# Patient Record
Sex: FEMALE | Race: WHITE | HISPANIC OR LATINO
[De-identification: names, ages, dates, MRNs, and addresses within clinical notes are randomized per-mention and may not be internally consistent; named-entity substitution may affect disease eponyms.]

---

## 2017-03-03 PROBLEM — Z00.00 ENCOUNTER FOR PREVENTIVE HEALTH EXAMINATION: Status: ACTIVE | Noted: 2017-03-03

## 2017-03-06 ENCOUNTER — APPOINTMENT (OUTPATIENT)
Dept: ANTEPARTUM | Facility: CLINIC | Age: 22
End: 2017-03-06

## 2017-03-06 VITALS
BODY MASS INDEX: 32.99 KG/M2 | WEIGHT: 198 LBS | TEMPERATURE: 98 F | HEART RATE: 88 BPM | OXYGEN SATURATION: 100 % | HEIGHT: 65 IN

## 2017-03-06 VITALS
BODY MASS INDEX: 32.99 KG/M2 | HEIGHT: 65 IN | DIASTOLIC BLOOD PRESSURE: 62 MMHG | SYSTOLIC BLOOD PRESSURE: 110 MMHG | WEIGHT: 198 LBS

## 2017-03-06 DIAGNOSIS — Z83.3 FAMILY HISTORY OF DIABETES MELLITUS: ICD-10-CM

## 2017-03-06 DIAGNOSIS — Z82.3 FAMILY HISTORY OF STROKE: ICD-10-CM

## 2017-03-06 DIAGNOSIS — Z80.6 FAMILY HISTORY OF LEUKEMIA: ICD-10-CM

## 2017-03-06 DIAGNOSIS — Z82.49 FAMILY HISTORY OF ISCHEMIC HEART DISEASE AND OTHER DISEASES OF THE CIRCULATORY SYSTEM: ICD-10-CM

## 2017-03-06 DIAGNOSIS — Z87.09 PERSONAL HISTORY OF OTHER DISEASES OF THE RESPIRATORY SYSTEM: ICD-10-CM

## 2017-04-05 ENCOUNTER — APPOINTMENT (OUTPATIENT)
Dept: OBGYN | Facility: CLINIC | Age: 22
End: 2017-04-05

## 2017-04-05 VITALS — DIASTOLIC BLOOD PRESSURE: 60 MMHG | SYSTOLIC BLOOD PRESSURE: 110 MMHG | WEIGHT: 198 LBS

## 2017-04-06 LAB
BILIRUB UR QL STRIP: NORMAL
CLARITY UR: CLEAR
COLLECTION METHOD: NORMAL
GLUCOSE UR-MCNC: NORMAL
HCG UR QL: 0.2 EU/DL
HGB UR QL STRIP.AUTO: NORMAL
KETONES UR-MCNC: NORMAL
LEUKOCYTE ESTERASE UR QL STRIP: NORMAL
NITRITE UR QL STRIP: NORMAL
PH UR STRIP: 5
PROT UR STRIP-MCNC: NORMAL
SP GR UR STRIP: 1.1

## 2017-04-18 ENCOUNTER — APPOINTMENT (OUTPATIENT)
Dept: ANTEPARTUM | Facility: CLINIC | Age: 22
End: 2017-04-18

## 2017-05-03 ENCOUNTER — APPOINTMENT (OUTPATIENT)
Dept: OBGYN | Facility: CLINIC | Age: 22
End: 2017-05-03

## 2017-05-03 ENCOUNTER — OTHER (OUTPATIENT)
Age: 22
End: 2017-05-03

## 2017-06-01 ENCOUNTER — APPOINTMENT (OUTPATIENT)
Dept: OBGYN | Facility: CLINIC | Age: 22
End: 2017-06-01

## 2017-06-01 ENCOUNTER — OUTPATIENT (OUTPATIENT)
Dept: OUTPATIENT SERVICES | Facility: HOSPITAL | Age: 22
LOS: 1 days | Discharge: HOME | End: 2017-06-01

## 2017-06-01 VITALS — DIASTOLIC BLOOD PRESSURE: 60 MMHG | SYSTOLIC BLOOD PRESSURE: 100 MMHG | WEIGHT: 209 LBS

## 2017-06-01 DIAGNOSIS — O99.89 OTHER SPECIFIED DISEASES AND CONDITIONS COMPLICATING PREGNANCY, CHILDBIRTH AND THE PUERPERIUM: ICD-10-CM

## 2017-06-07 LAB
BASOPHILS # BLD: 0.01 TH/MM3
BASOPHILS NFR BLD: 0.1 %
DIFFERENTIAL METHOD BLD: NORMAL
EOSINOPHIL # BLD: 0.12 TH/MM3
EOSINOPHIL NFR BLD: 1.4 %
ERYTHROCYTE [DISTWIDTH] IN BLOOD BY AUTOMATED COUNT: 14.7 %
GLUCOSE 1H P GLC SERPL-MCNC: 94 MG/DL
GRANULOCYTES # BLD: 6.21 TH/MM3
GRANULOCYTES NFR BLD: 71.9 %
HCT VFR BLD AUTO: 33.7 %
HGB BLD-MCNC: 10.6 G/DL
IMM GRANULOCYTES # BLD: 0.05 TH/MM3
IMM GRANULOCYTES NFR BLD: 0.6 %
LYMPHOCYTES # BLD: 1.84 TH/MM3
LYMPHOCYTES NFR BLD: 21.3 %
MCH RBC QN AUTO: 24.9 PG
MCHC RBC AUTO-ENTMCNC: 31.5 G/DL
MCV RBC AUTO: 79.3 FL
MONOCYTES # BLD: 0.41 TH/MM3
MONOCYTES NFR BLD: 4.7 %
PLATELET # BLD: 135 TH/MM3
PMV BLD AUTO: 12.2 FL
RBC # BLD AUTO: 4.25 MIL/MM3
WBC # BLD: 8.64 TH/MM3

## 2017-06-15 ENCOUNTER — APPOINTMENT (OUTPATIENT)
Dept: OBGYN | Facility: CLINIC | Age: 22
End: 2017-06-15

## 2017-06-15 ENCOUNTER — OUTPATIENT (OUTPATIENT)
Dept: OUTPATIENT SERVICES | Facility: HOSPITAL | Age: 22
LOS: 1 days | Discharge: HOME | End: 2017-06-15

## 2017-06-15 ENCOUNTER — RESULT CHARGE (OUTPATIENT)
Age: 22
End: 2017-06-15

## 2017-06-15 VITALS — DIASTOLIC BLOOD PRESSURE: 62 MMHG | WEIGHT: 206 LBS | SYSTOLIC BLOOD PRESSURE: 100 MMHG

## 2017-06-15 DIAGNOSIS — O99.89 OTHER SPECIFIED DISEASES AND CONDITIONS COMPLICATING PREGNANCY, CHILDBIRTH AND THE PUERPERIUM: ICD-10-CM

## 2017-06-16 LAB
BILIRUB UR QL STRIP: NORMAL
CLARITY UR: CLEAR
COLLECTION METHOD: NORMAL
GLUCOSE UR-MCNC: NORMAL
HCG UR QL: 0.2 EU/DL
HGB UR QL STRIP.AUTO: NORMAL
KETONES UR-MCNC: NORMAL
LEUKOCYTE ESTERASE UR QL STRIP: NORMAL
NITRITE UR QL STRIP: NORMAL
PH UR STRIP: 7
PROT UR STRIP-MCNC: NORMAL
SP GR UR STRIP: 1.02

## 2017-06-21 ENCOUNTER — APPOINTMENT (OUTPATIENT)
Dept: ANTEPARTUM | Facility: CLINIC | Age: 22
End: 2017-06-21

## 2017-06-28 DIAGNOSIS — Z3A.28 28 WEEKS GESTATION OF PREGNANCY: ICD-10-CM

## 2017-06-28 DIAGNOSIS — Z3A.30 30 WEEKS GESTATION OF PREGNANCY: ICD-10-CM

## 2017-06-29 ENCOUNTER — APPOINTMENT (OUTPATIENT)
Dept: OBGYN | Facility: CLINIC | Age: 22
End: 2017-06-29

## 2017-07-06 ENCOUNTER — APPOINTMENT (OUTPATIENT)
Dept: OBGYN | Facility: CLINIC | Age: 22
End: 2017-07-06

## 2017-07-06 ENCOUNTER — RESULT CHARGE (OUTPATIENT)
Age: 22
End: 2017-07-06

## 2017-07-06 ENCOUNTER — OUTPATIENT (OUTPATIENT)
Dept: OUTPATIENT SERVICES | Facility: HOSPITAL | Age: 22
LOS: 1 days | Discharge: HOME | End: 2017-07-06

## 2017-07-06 VITALS
WEIGHT: 209 LBS | BODY MASS INDEX: 34.82 KG/M2 | DIASTOLIC BLOOD PRESSURE: 72 MMHG | SYSTOLIC BLOOD PRESSURE: 110 MMHG | HEIGHT: 65 IN

## 2017-07-06 DIAGNOSIS — O99.89 OTHER SPECIFIED DISEASES AND CONDITIONS COMPLICATING PREGNANCY, CHILDBIRTH AND THE PUERPERIUM: ICD-10-CM

## 2017-07-06 DIAGNOSIS — Z3A.33 33 WEEKS GESTATION OF PREGNANCY: ICD-10-CM

## 2017-07-07 LAB
BILIRUB UR QL STRIP: NORMAL
CLARITY UR: NORMAL
COLLECTION METHOD: NORMAL
GLUCOSE UR-MCNC: NORMAL
HCG UR QL: 0.2 EU/DL
HGB UR QL STRIP.AUTO: NORMAL
KETONES UR-MCNC: NORMAL
LEUKOCYTE ESTERASE UR QL STRIP: NORMAL
NITRITE UR QL STRIP: NORMAL
PH UR STRIP: 6.5
PROT UR STRIP-MCNC: NORMAL
SP GR UR STRIP: 1.02

## 2017-07-20 ENCOUNTER — RESULT REVIEW (OUTPATIENT)
Age: 22
End: 2017-07-20

## 2017-07-20 ENCOUNTER — RESULT CHARGE (OUTPATIENT)
Age: 22
End: 2017-07-20

## 2017-07-20 ENCOUNTER — APPOINTMENT (OUTPATIENT)
Dept: OBGYN | Facility: CLINIC | Age: 22
End: 2017-07-20

## 2017-07-20 ENCOUNTER — OUTPATIENT (OUTPATIENT)
Dept: OUTPATIENT SERVICES | Facility: HOSPITAL | Age: 22
LOS: 1 days | Discharge: HOME | End: 2017-07-20

## 2017-07-20 VITALS
SYSTOLIC BLOOD PRESSURE: 110 MMHG | DIASTOLIC BLOOD PRESSURE: 70 MMHG | WEIGHT: 215 LBS | HEIGHT: 65 IN | BODY MASS INDEX: 35.82 KG/M2

## 2017-07-20 DIAGNOSIS — O99.89 OTHER SPECIFIED DISEASES AND CONDITIONS COMPLICATING PREGNANCY, CHILDBIRTH AND THE PUERPERIUM: ICD-10-CM

## 2017-07-20 LAB
BILIRUB UR QL STRIP: NORMAL
CLARITY UR: CLEAR
COLLECTION METHOD: NORMAL
GLUCOSE UR-MCNC: NORMAL
HCG UR QL: 0.2 EU/DL
HGB UR QL STRIP.AUTO: NORMAL
KETONES UR-MCNC: NORMAL
LEUKOCYTE ESTERASE UR QL STRIP: NORMAL
NITRITE UR QL STRIP: NORMAL
PH UR STRIP: 6.5
PROT UR STRIP-MCNC: NORMAL
SP GR UR STRIP: 1.01

## 2017-07-21 LAB
BASOPHILS # BLD: 0.02 TH/MM3
BASOPHILS NFR BLD: 0.2 %
DIFFERENTIAL METHOD BLD: NORMAL
EOSINOPHIL # BLD: 0.09 TH/MM3
EOSINOPHIL NFR BLD: 0.9 %
ERYTHROCYTE [DISTWIDTH] IN BLOOD BY AUTOMATED COUNT: 15.7 %
GRANULOCYTES # BLD: 6.93 TH/MM3
GRANULOCYTES NFR BLD: 71.9 %
HCT VFR BLD AUTO: 36.7 %
HGB BLD-MCNC: 11.4 G/DL
IMM GRANULOCYTES # BLD: 0.05 TH/MM3
IMM GRANULOCYTES NFR BLD: 0.5 %
LYMPHOCYTES # BLD: 1.92 TH/MM3
LYMPHOCYTES NFR BLD: 19.9 %
MCH RBC QN AUTO: 23.8 PG
MCHC RBC AUTO-ENTMCNC: 31.1 G/DL
MCV RBC AUTO: 76.6 FL
MONOCYTES # BLD: 0.64 TH/MM3
MONOCYTES NFR BLD: 6.6 %
PLATELET # BLD: 184 TH/MM3
PMV BLD AUTO: 11.7 FL
RBC # BLD AUTO: 4.79 MIL/MM3
WBC # BLD: 9.65 TH/MM3

## 2017-07-24 LAB
C TRACH RRNA SPEC QL NAA+PROBE: NOT DETECTED
GP B STREP DNA SPEC QL NAA+PROBE: NORMAL
HIV1+2 AB SPEC QL IA.RAPID: NONREACTIVE
N GONORRHOEA RRNA SPEC QL NAA+PROBE: NOT DETECTED
S PYO DNA THROAT QL NAA+PROBE: DETECTED
SPECIMEN SOURCE: NORMAL

## 2017-07-27 ENCOUNTER — APPOINTMENT (OUTPATIENT)
Dept: OBGYN | Facility: CLINIC | Age: 22
End: 2017-07-27

## 2017-07-27 ENCOUNTER — OUTPATIENT (OUTPATIENT)
Dept: OUTPATIENT SERVICES | Facility: HOSPITAL | Age: 22
LOS: 1 days | Discharge: HOME | End: 2017-07-27

## 2017-07-27 VITALS — SYSTOLIC BLOOD PRESSURE: 100 MMHG | DIASTOLIC BLOOD PRESSURE: 78 MMHG | WEIGHT: 217.38 LBS

## 2017-07-27 DIAGNOSIS — O99.89 OTHER SPECIFIED DISEASES AND CONDITIONS COMPLICATING PREGNANCY, CHILDBIRTH AND THE PUERPERIUM: ICD-10-CM

## 2017-07-27 DIAGNOSIS — Z87.898 PERSONAL HISTORY OF OTHER SPECIFIED CONDITIONS: ICD-10-CM

## 2017-07-27 DIAGNOSIS — Z3A.35 35 WEEKS GESTATION OF PREGNANCY: ICD-10-CM

## 2017-07-27 DIAGNOSIS — Z3A.36 36 WEEKS GESTATION OF PREGNANCY: ICD-10-CM

## 2017-07-27 LAB
BILIRUB UR QL STRIP: NEGATIVE
CLARITY UR: NORMAL
COLLECTION METHOD: NORMAL
GLUCOSE UR-MCNC: NEGATIVE
HCG UR QL: 0.2 EU/DL
HGB UR QL STRIP.AUTO: NORMAL
KETONES UR-MCNC: NEGATIVE
LEUKOCYTE ESTERASE UR QL STRIP: NORMAL
NITRITE UR QL STRIP: NEGATIVE
PH UR STRIP: 6.5
PROT UR STRIP-MCNC: NORMAL
SP GR UR STRIP: 1.01

## 2017-07-28 ENCOUNTER — APPOINTMENT (OUTPATIENT)
Dept: OBGYN | Facility: CLINIC | Age: 22
End: 2017-07-28

## 2017-08-03 ENCOUNTER — APPOINTMENT (OUTPATIENT)
Dept: OBGYN | Facility: CLINIC | Age: 22
End: 2017-08-03

## 2017-08-08 ENCOUNTER — INPATIENT (INPATIENT)
Facility: HOSPITAL | Age: 22
LOS: 0 days | Discharge: HOME | End: 2017-08-09
Attending: OBSTETRICS & GYNECOLOGY | Admitting: OBSTETRICS & GYNECOLOGY

## 2017-08-08 DIAGNOSIS — O99.89 OTHER SPECIFIED DISEASES AND CONDITIONS COMPLICATING PREGNANCY, CHILDBIRTH AND THE PUERPERIUM: ICD-10-CM

## 2017-08-11 ENCOUNTER — CLINICAL ADVICE (OUTPATIENT)
Age: 22
End: 2017-08-11

## 2017-08-11 DIAGNOSIS — Z87.898 PERSONAL HISTORY OF OTHER SPECIFIED CONDITIONS: ICD-10-CM

## 2017-08-11 DIAGNOSIS — Z3A.38 38 WEEKS GESTATION OF PREGNANCY: ICD-10-CM

## 2017-09-21 ENCOUNTER — APPOINTMENT (OUTPATIENT)
Dept: OBGYN | Facility: CLINIC | Age: 22
End: 2017-09-21

## 2017-09-21 ENCOUNTER — OUTPATIENT (OUTPATIENT)
Dept: OUTPATIENT SERVICES | Facility: HOSPITAL | Age: 22
LOS: 1 days | Discharge: HOME | End: 2017-09-21

## 2017-09-21 VITALS — SYSTOLIC BLOOD PRESSURE: 110 MMHG | DIASTOLIC BLOOD PRESSURE: 62 MMHG | WEIGHT: 205 LBS

## 2017-09-21 DIAGNOSIS — O99.89 OTHER SPECIFIED DISEASES AND CONDITIONS COMPLICATING PREGNANCY, CHILDBIRTH AND THE PUERPERIUM: ICD-10-CM

## 2017-12-21 ENCOUNTER — OUTPATIENT (OUTPATIENT)
Dept: OUTPATIENT SERVICES | Facility: HOSPITAL | Age: 22
LOS: 1 days | Discharge: HOME | End: 2017-12-21

## 2017-12-21 ENCOUNTER — APPOINTMENT (OUTPATIENT)
Dept: OBGYN | Facility: CLINIC | Age: 22
End: 2017-12-21

## 2017-12-21 VITALS — SYSTOLIC BLOOD PRESSURE: 104 MMHG | WEIGHT: 222 LBS | DIASTOLIC BLOOD PRESSURE: 68 MMHG

## 2017-12-21 DIAGNOSIS — O99.89 OTHER SPECIFIED DISEASES AND CONDITIONS COMPLICATING PREGNANCY, CHILDBIRTH AND THE PUERPERIUM: ICD-10-CM

## 2017-12-21 DIAGNOSIS — B00.9 HERPESVIRAL INFECTION, UNSPECIFIED: ICD-10-CM

## 2019-02-24 ENCOUNTER — EMERGENCY (EMERGENCY)
Facility: HOSPITAL | Age: 24
LOS: 0 days | Discharge: HOME | End: 2019-02-24
Attending: EMERGENCY MEDICINE | Admitting: EMERGENCY MEDICINE

## 2019-02-24 VITALS
OXYGEN SATURATION: 99 % | HEART RATE: 78 BPM | RESPIRATION RATE: 17 BRPM | DIASTOLIC BLOOD PRESSURE: 62 MMHG | SYSTOLIC BLOOD PRESSURE: 127 MMHG

## 2019-02-24 VITALS
RESPIRATION RATE: 18 BRPM | DIASTOLIC BLOOD PRESSURE: 61 MMHG | HEIGHT: 65 IN | WEIGHT: 220.02 LBS | TEMPERATURE: 97 F | SYSTOLIC BLOOD PRESSURE: 124 MMHG | HEART RATE: 82 BPM | OXYGEN SATURATION: 97 %

## 2019-02-24 DIAGNOSIS — J45.909 UNSPECIFIED ASTHMA, UNCOMPLICATED: ICD-10-CM

## 2019-02-24 DIAGNOSIS — N83.209 UNSPECIFIED OVARIAN CYST, UNSPECIFIED SIDE: ICD-10-CM

## 2019-02-24 DIAGNOSIS — R10.9 UNSPECIFIED ABDOMINAL PAIN: ICD-10-CM

## 2019-02-24 LAB
ALBUMIN SERPL ELPH-MCNC: 4.4 G/DL — SIGNIFICANT CHANGE UP (ref 3.5–5.2)
ALP SERPL-CCNC: 68 U/L — SIGNIFICANT CHANGE UP (ref 30–115)
ALT FLD-CCNC: 27 U/L — SIGNIFICANT CHANGE UP (ref 0–41)
ANION GAP SERPL CALC-SCNC: 16 MMOL/L — HIGH (ref 7–14)
APPEARANCE UR: CLEAR — SIGNIFICANT CHANGE UP
AST SERPL-CCNC: 21 U/L — SIGNIFICANT CHANGE UP (ref 0–41)
BASE EXCESS BLDV CALC-SCNC: 0 MMOL/L — SIGNIFICANT CHANGE UP (ref -2–2)
BASOPHILS # BLD AUTO: 0.04 K/UL — SIGNIFICANT CHANGE UP (ref 0–0.2)
BASOPHILS NFR BLD AUTO: 0.6 % — SIGNIFICANT CHANGE UP (ref 0–1)
BILIRUB SERPL-MCNC: 0.2 MG/DL — SIGNIFICANT CHANGE UP (ref 0.2–1.2)
BILIRUB UR-MCNC: NEGATIVE — SIGNIFICANT CHANGE UP
BUN SERPL-MCNC: 10 MG/DL — SIGNIFICANT CHANGE UP (ref 10–20)
CA-I SERPL-SCNC: 1.2 MMOL/L — SIGNIFICANT CHANGE UP (ref 1.12–1.3)
CALCIUM SERPL-MCNC: 9.3 MG/DL — SIGNIFICANT CHANGE UP (ref 8.5–10.1)
CHLORIDE SERPL-SCNC: 105 MMOL/L — SIGNIFICANT CHANGE UP (ref 98–110)
CO2 SERPL-SCNC: 22 MMOL/L — SIGNIFICANT CHANGE UP (ref 17–32)
COLOR SPEC: YELLOW — SIGNIFICANT CHANGE UP
CREAT SERPL-MCNC: 0.7 MG/DL — SIGNIFICANT CHANGE UP (ref 0.7–1.5)
DIFF PNL FLD: NEGATIVE — SIGNIFICANT CHANGE UP
EOSINOPHIL # BLD AUTO: 0.17 K/UL — SIGNIFICANT CHANGE UP (ref 0–0.7)
EOSINOPHIL NFR BLD AUTO: 2.7 % — SIGNIFICANT CHANGE UP (ref 0–8)
GAS PNL BLDV: 141 MMOL/L — SIGNIFICANT CHANGE UP (ref 136–145)
GAS PNL BLDV: SIGNIFICANT CHANGE UP
GAS PNL BLDV: SIGNIFICANT CHANGE UP
GLUCOSE SERPL-MCNC: 102 MG/DL — HIGH (ref 70–99)
GLUCOSE UR QL: NEGATIVE — SIGNIFICANT CHANGE UP
HCO3 BLDV-SCNC: 26 MMOL/L — SIGNIFICANT CHANGE UP (ref 22–29)
HCT VFR BLD CALC: 41.4 % — SIGNIFICANT CHANGE UP (ref 37–47)
HCT VFR BLDA CALC: 41 % — SIGNIFICANT CHANGE UP (ref 34–44)
HGB BLD CALC-MCNC: 13 G/DL — LOW (ref 14–18)
HGB BLD-MCNC: 12.9 G/DL — SIGNIFICANT CHANGE UP (ref 12–16)
IMM GRANULOCYTES NFR BLD AUTO: 0.5 % — HIGH (ref 0.1–0.3)
KETONES UR-MCNC: NEGATIVE — SIGNIFICANT CHANGE UP
LACTATE BLDV-MCNC: 0.6 MMOL/L — SIGNIFICANT CHANGE UP (ref 0.5–1.6)
LEUKOCYTE ESTERASE UR-ACNC: NEGATIVE — SIGNIFICANT CHANGE UP
LIDOCAIN IGE QN: 25 U/L — SIGNIFICANT CHANGE UP (ref 7–60)
LYMPHOCYTES # BLD AUTO: 2.02 K/UL — SIGNIFICANT CHANGE UP (ref 1.2–3.4)
LYMPHOCYTES # BLD AUTO: 31.8 % — SIGNIFICANT CHANGE UP (ref 20.5–51.1)
MCHC RBC-ENTMCNC: 22.6 PG — LOW (ref 27–31)
MCHC RBC-ENTMCNC: 31.2 G/DL — LOW (ref 32–37)
MCV RBC AUTO: 72.4 FL — LOW (ref 81–99)
MONOCYTES # BLD AUTO: 0.49 K/UL — SIGNIFICANT CHANGE UP (ref 0.1–0.6)
MONOCYTES NFR BLD AUTO: 7.7 % — SIGNIFICANT CHANGE UP (ref 1.7–9.3)
NEUTROPHILS # BLD AUTO: 3.61 K/UL — SIGNIFICANT CHANGE UP (ref 1.4–6.5)
NEUTROPHILS NFR BLD AUTO: 56.7 % — SIGNIFICANT CHANGE UP (ref 42.2–75.2)
NITRITE UR-MCNC: NEGATIVE — SIGNIFICANT CHANGE UP
NRBC # BLD: 0 /100 WBCS — SIGNIFICANT CHANGE UP (ref 0–0)
PCO2 BLDV: 46 MMHG — SIGNIFICANT CHANGE UP (ref 41–51)
PH BLDV: 7.36 — SIGNIFICANT CHANGE UP (ref 7.26–7.43)
PH UR: 5.5 — SIGNIFICANT CHANGE UP (ref 5–8)
PLATELET # BLD AUTO: 215 K/UL — SIGNIFICANT CHANGE UP (ref 130–400)
PO2 BLDV: 32 MMHG — SIGNIFICANT CHANGE UP (ref 20–40)
POTASSIUM BLDV-SCNC: 3.6 MMOL/L — SIGNIFICANT CHANGE UP (ref 3.3–5.6)
POTASSIUM SERPL-MCNC: 3.9 MMOL/L — SIGNIFICANT CHANGE UP (ref 3.5–5)
POTASSIUM SERPL-SCNC: 3.9 MMOL/L — SIGNIFICANT CHANGE UP (ref 3.5–5)
PROT SERPL-MCNC: 7.3 G/DL — SIGNIFICANT CHANGE UP (ref 6–8)
PROT UR-MCNC: NEGATIVE — SIGNIFICANT CHANGE UP
RBC # BLD: 5.72 M/UL — HIGH (ref 4.2–5.4)
RBC # FLD: 17.8 % — HIGH (ref 11.5–14.5)
SAO2 % BLDV: 58 % — SIGNIFICANT CHANGE UP
SODIUM SERPL-SCNC: 143 MMOL/L — SIGNIFICANT CHANGE UP (ref 135–146)
SP GR SPEC: >=1.03 — SIGNIFICANT CHANGE UP (ref 1.01–1.03)
UROBILINOGEN FLD QL: 0.2 — SIGNIFICANT CHANGE UP (ref 0.2–0.2)
WBC # BLD: 6.36 K/UL — SIGNIFICANT CHANGE UP (ref 4.8–10.8)
WBC # FLD AUTO: 6.36 K/UL — SIGNIFICANT CHANGE UP (ref 4.8–10.8)

## 2019-02-24 RX ORDER — ONDANSETRON 8 MG/1
4 TABLET, FILM COATED ORAL ONCE
Qty: 0 | Refills: 0 | Status: COMPLETED | OUTPATIENT
Start: 2019-02-24 | End: 2019-02-24

## 2019-02-24 RX ORDER — KETOROLAC TROMETHAMINE 30 MG/ML
15 SYRINGE (ML) INJECTION ONCE
Qty: 0 | Refills: 0 | Status: DISCONTINUED | OUTPATIENT
Start: 2019-02-24 | End: 2019-02-24

## 2019-02-24 RX ORDER — SODIUM CHLORIDE 9 MG/ML
1000 INJECTION, SOLUTION INTRAVENOUS ONCE
Qty: 0 | Refills: 0 | Status: COMPLETED | OUTPATIENT
Start: 2019-02-24 | End: 2019-02-24

## 2019-02-24 RX ADMIN — Medication 15 MILLIGRAM(S): at 08:12

## 2019-02-24 RX ADMIN — ONDANSETRON 4 MILLIGRAM(S): 8 TABLET, FILM COATED ORAL at 08:11

## 2019-02-24 RX ADMIN — SODIUM CHLORIDE 2000 MILLILITER(S): 9 INJECTION, SOLUTION INTRAVENOUS at 07:57

## 2019-02-24 RX ADMIN — Medication 15 MILLIGRAM(S): at 08:27

## 2019-02-24 RX ADMIN — SODIUM CHLORIDE 1000 MILLILITER(S): 9 INJECTION, SOLUTION INTRAVENOUS at 10:46

## 2019-02-24 NOTE — ED ADULT NURSE NOTE - OBJECTIVE STATEMENT
Patient presents to ED with complains of abdomen pain for 3 days with nausea, denies fever, chills, vomiting, chest pain, diarrhea, constipation, and SOB

## 2019-02-24 NOTE — ED PROVIDER NOTE - CLINICAL SUMMARY MEDICAL DECISION MAKING FREE TEXT BOX
23yF p/w intermittent abd pain, severe suprapubic/back pain, though improved at present. Labs reassuring. Upreg neg and UA neg for UTI.  US and CT w/ 5cm hemorrhagic ovarian cyst and pelvic free fluid c/w rupture.  As pt is asymptomatic at present, hemodynamically stable and comfortable, with benign abd exam, will dc home with supportive care, ob/gyn f/u, return precautions.

## 2019-02-24 NOTE — ED PROVIDER NOTE - CARE PROVIDER_API CALL
Mariama Le; PhD)  OBSGYN  Physicians  [address] [place] [address]  Phone: [phone]  Fax: [phone]  Follow Up Time:

## 2019-02-24 NOTE — ED PROVIDER NOTE - NS ED ROS FT
Constitutional: no fevers/chills, no sick contacts  Eyes: No visual changes, eye pain or discharge. No photophobia  ENMT: No hearing changes, pain, discharge or infections. No sore throat or drooling.  Neck:  No neck pain or stiffness. No limited ROM  Cardiac: No SOB or edema. No chest pain with exertion.  Respiratory: No cough or respiratory distress. No hemoptysis. No history of asthma or RAD  GI: + nausea, no vomiting, diarrhea, +abdominal pain  : + dysuria, no frequency or burning. No discharge  MS: No myalgia, muscle weakness, joint pain or back pain  Neuro: No headache or weakness. No LOC  Skin: No skin rash  Endo: no diabetes or thyroid dysfunction  Heme: no abnormal bleeding or clotting  Except as documented in the HPI, all other systems are negative

## 2019-02-24 NOTE — ED PROVIDER NOTE - PHYSICAL EXAMINATION
CONSTITUTIONAL: well developed; well nourished; well appearing in no acute distress  HEAD: normocephalic; atraumatic  EYES: no conjunctival injection, no scleral icterus  ENT: no nasal discharge; airway clear.  NECK: supple; non tender. + full passive ROM in all directions  CARD: S1, S2 normal; no murmurs, gallops, or rubs. Regular rate and rhythm  RESP: no wheezes, rales or rhonchi. Good air movement bilaterally without significant accessory muscle use  ABD: soft; non-distended; minimal epigastric pain, no suprapubic tenderness, minimal R CVAT. No rebound, no guarding, no pulsatile abdominal mass  EXT: moving all extremities spontaneously, normal ROM. No clubbing, cyanosis or edema  SKIN: warm and dry, no lesions noted  NEURO: alert, oriented, CN II-XII grossly intact, motor and sensory grossly intact, speech nonslurred, no focal deficits. GCS 15  PSYCH: calm, cooperative, appropriate, good eye contact, logical thought process, no apparent danger to self or others

## 2019-02-24 NOTE — ED PROVIDER NOTE - OBJECTIVE STATEMENT
23yF mild intermittent asthma p/w abd pain.  Pt reports recent "stomach virus" characterized by abd cramping w/o v/d.  Three days ago, she had an episode of "twisting" sensation in her abd associated with severe pain from epigastrium radiating down to suprapubic region.  It resolved w/o intervention and she did not seek medical care.  Tonight, she had a similar if more severe episode that woke her from sleep at 4:30am - severe twisting pain in her epigastrium, associated pelvic discomfort (suprapubic, low back, rectal) and had severe pain with urination and back pain.  No hematuria, v/d.  Pain has improved spontaneously while waiting in the ED, but she is still in some discomfort and very concerned about etiology of her pain.    PMH: intermittent asthma  PSH: EAB 2mo ago, IUD placed  Meds: denies  NKDA  nonsmoker  lives at home w/ her husb and 2 children

## 2019-02-24 NOTE — ED PROVIDER NOTE - PROGRESS NOTE DETAILS
US and CT w/ 5cm hemorrhagic ovarian cyst and some pelvic free fluid, c/w ruptured hemorrhagic cyst. Pt is hemodynamically stable, comfortable, suspect earlier pain related to earlier rupture.  No need for emergent intervention but recommend f/u with ob/gyn, return precautions.

## 2019-02-24 NOTE — ED PROVIDER NOTE - NSFOLLOWUPINSTRUCTIONS_ED_ALL_ED_FT
Call to make an appointment with ob/gyn for your pelvic pain. You were seen in the ED for pelvic pain, found to have a ruptured hemorrhagic ovarian cyst.  You may have a little more pain/discomfort with the fluid from the ruptured cyst, but you should be feeling better overall. If you continue to have significant pain, follow up with ob or return to the ED.    Ovarian Cyst  An ovarian cyst is a fluid-filled sac that forms on an ovary. The ovaries are small organs that produce eggs in women. Various types of cysts can form on the ovaries. Some may cause symptoms and require treatment. Most ovarian cysts go away on their own, are not cancerous (are benign), and do not cause problems.    Common types of ovarian cysts include:    Functional (follicle) cysts.    Occur during the menstrual cycle, and usually go away with the next menstrual cycle if you do not get pregnant.  Usually cause no symptoms.    Endometriomas.    Are cysts that form from the tissue that lines the uterus (endometrium).  Are sometimes called “chocolate cysts” because they become filled with blood that turns brown.  Can cause pain in the lower abdomen during intercourse and during your period.    Cystadenoma cysts.    Develop from cells on the outside surface of the ovary.  Can get very large and cause lower abdomen pain and pain with intercourse.  Can cause severe pain if they twist or break open (rupture).    Dermoid cysts.    Are sometimes found in both ovaries.  May contain different kinds of body tissue, such as skin, teeth, hair, or cartilage.  Usually do not cause symptoms unless they get very big.    Theca lutein cysts.    Occur when too much of a certain hormone (human chorionic gonadotropin) is produced and overstimulates the ovaries to produce an egg.  Are most common after having procedures used to assist with the conception of a baby (in vitro fertilization).      What are the causes?  Ovarian cysts may be caused by:    Ovarian hyperstimulation syndrome. This is a condition that can develop from taking fertility medicines. It causes multiple large ovarian cysts to form.  Polycystic ovarian syndrome (PCOS). This is a common hormonal disorder that can cause ovarian cysts, as well as problems with your period or fertility.    What increases the risk?  The following factors may make you more likely to develop ovarian cysts:    Being overweight or obese.  Taking fertility medicines.  Taking certain forms of hormonal birth control.  Smoking.    What are the signs or symptoms?  Many ovarian cysts do not cause symptoms. If symptoms are present, they may include:    Pelvic pain or pressure.  Pain in the lower abdomen.  Pain during sex.  Abdominal swelling.  Abnormal menstrual periods.  Increasing pain with menstrual periods.    How is this diagnosed?  These cysts are commonly found during a routine pelvic exam. You may have tests to find out more about the cyst, such as:    Ultrasound.  X-ray of the pelvis.  CT scan.  MRI.  Blood tests.    How is this treated?  Many ovarian cysts go away on their own without treatment. Your health care provider may want to check your cyst regularly for 2–3 months to see if it changes. If you are in menopause, it is especially important to have your cyst monitored closely because menopausal women have a higher rate of ovarian cancer.    When treatment is needed, it may include:    Medicines to help relieve pain.  A procedure to drain the cyst (aspiration).  Surgery to remove the whole cyst.  Hormone treatment or birth control pills. These methods are sometimes used to help dissolve a cyst.    Follow these instructions at home:  Take over-the-counter and prescription medicines only as told by your health care provider.  Do not drive or use heavy machinery while taking prescription pain medicine.  Get regular pelvic exams and Pap tests as often as told by your health care provider.  Return to your normal activities as told by your health care provider. Ask your health care provider what activities are safe for you.  Do not use any products that contain nicotine or tobacco, such as cigarettes and e-cigarettes. If you need help quitting, ask your health care provider.  Keep all follow-up visits as told by your health care provider. This is important.  Contact a health care provider if:  Your periods are late, irregular, or painful, or they stop.  You have pelvic pain that does not go away.  You have pressure on your bladder or trouble emptying your bladder completely.  You have pain during sex.  You have any of the following in your abdomen:    A feeling of fullness.  Pressure.  Discomfort.  Pain that does not go away.  Swelling.    You feel generally ill.  You become constipated.  You lose your appetite.  You develop severe acne.  You start to have more body hair and facial hair.  You are gaining weight or losing weight without changing your exercise and eating habits.  You think you may be pregnant.  Get help right away if:  You have abdominal pain that is severe or gets worse.  You cannot eat or drink without vomiting.  You suddenly develop a fever.  Your menstrual period is much heavier than usual.  This information is not intended to replace advice given to you by your health care provider. Make sure you discuss any questions you have with your health care provider.

## 2019-02-25 LAB
CULTURE RESULTS: SIGNIFICANT CHANGE UP
SPECIMEN SOURCE: SIGNIFICANT CHANGE UP

## 2019-02-26 ENCOUNTER — APPOINTMENT (OUTPATIENT)
Dept: OBGYN | Facility: CLINIC | Age: 24
End: 2019-02-26
Payer: MEDICAID

## 2019-02-26 VITALS — SYSTOLIC BLOOD PRESSURE: 100 MMHG | WEIGHT: 230 LBS | DIASTOLIC BLOOD PRESSURE: 65 MMHG

## 2019-02-26 DIAGNOSIS — F19.90 OTHER PSYCHOACTIVE SUBSTANCE USE, UNSPECIFIED, UNCOMPLICATED: ICD-10-CM

## 2019-02-26 DIAGNOSIS — R10.2 PELVIC AND PERINEAL PAIN: ICD-10-CM

## 2019-02-26 LAB
BILIRUB UR QL STRIP: NORMAL
GLUCOSE UR-MCNC: NORMAL
HCG UR QL: 0.2 EU/DL
HGB UR QL STRIP.AUTO: NORMAL
KETONES UR-MCNC: NORMAL
LEUKOCYTE ESTERASE UR QL STRIP: NORMAL
NITRITE UR QL STRIP: NORMAL
PH UR STRIP: 5.5
PROT UR STRIP-MCNC: NORMAL
SP GR UR STRIP: 1.03

## 2019-02-26 PROCEDURE — 81003 URINALYSIS AUTO W/O SCOPE: CPT | Mod: QW

## 2019-02-26 PROCEDURE — 99213 OFFICE O/P EST LOW 20 MIN: CPT

## 2019-02-26 RX ORDER — PYRIDOXINE HCL (VITAMIN B6) 25 MG
25 TABLET ORAL
Qty: 90 | Refills: 3 | Status: COMPLETED | COMMUNITY
Start: 2017-03-24 | End: 2019-02-26

## 2019-02-26 RX ORDER — PROCHLORPERAZINE 25 MG/1
25 SUPPOSITORY RECTAL
Refills: 0 | Status: COMPLETED | COMMUNITY
End: 2019-02-26

## 2019-02-26 RX ORDER — NORGESTIMATE AND ETHINYL ESTRADIOL 7DAYSX3 28
0.18/0.215/0.25 KIT ORAL DAILY
Qty: 30 | Refills: 5 | Status: COMPLETED | COMMUNITY
Start: 2017-09-21 | End: 2019-02-26

## 2019-02-26 RX ORDER — ONDANSETRON 8 MG/1
8 TABLET, ORALLY DISINTEGRATING ORAL
Refills: 0 | Status: COMPLETED | COMMUNITY
End: 2019-02-26

## 2019-02-26 RX ORDER — DOXYLAMINE SUCCINATE 25 MG
25 TABLET ORAL TWICE DAILY
Qty: 14 | Refills: 1 | Status: COMPLETED | COMMUNITY
Start: 2017-03-06 | End: 2019-02-26

## 2019-02-26 NOTE — PHYSICAL EXAM
[Awake] : awake [Alert] : alert [Soft] : soft [Oriented x3] : oriented to person, place, and time [Depressed Mood] : depressed mood [Acute Distress] : no acute distress [Tender] : non tender [Normal] : external genitalia [RRR, No Murmurs] : RRR, no murmurs [CTAB] : CTAB

## 2019-04-03 ENCOUNTER — APPOINTMENT (OUTPATIENT)
Dept: OBGYN | Facility: CLINIC | Age: 24
End: 2019-04-03
Payer: MEDICAID

## 2019-04-03 PROCEDURE — 93975 VASCULAR STUDY: CPT

## 2019-04-03 PROCEDURE — 76830 TRANSVAGINAL US NON-OB: CPT

## 2019-08-02 ENCOUNTER — OTHER (OUTPATIENT)
Age: 24
End: 2019-08-02

## 2019-08-23 ENCOUNTER — EMERGENCY (EMERGENCY)
Facility: HOSPITAL | Age: 24
LOS: 0 days | Discharge: HOME | End: 2019-08-23
Attending: EMERGENCY MEDICINE | Admitting: EMERGENCY MEDICINE
Payer: MEDICAID

## 2019-08-23 VITALS
RESPIRATION RATE: 19 BRPM | TEMPERATURE: 98 F | HEART RATE: 64 BPM | WEIGHT: 238.1 LBS | SYSTOLIC BLOOD PRESSURE: 111 MMHG | OXYGEN SATURATION: 98 % | DIASTOLIC BLOOD PRESSURE: 74 MMHG

## 2019-08-23 VITALS
OXYGEN SATURATION: 98 % | RESPIRATION RATE: 18 BRPM | HEART RATE: 72 BPM | SYSTOLIC BLOOD PRESSURE: 136 MMHG | DIASTOLIC BLOOD PRESSURE: 68 MMHG | TEMPERATURE: 98 F

## 2019-08-23 DIAGNOSIS — M25.579 PAIN IN UNSPECIFIED ANKLE AND JOINTS OF UNSPECIFIED FOOT: ICD-10-CM
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
## 2019-08-23 DIAGNOSIS — S93.401A SPRAIN OF UNSPECIFIED LIGAMENT OF RIGHT ANKLE, INITIAL ENCOUNTER: ICD-10-CM
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
## 2019-08-23 PROCEDURE — 73562 X-RAY EXAM OF KNEE 3: CPT | Mod: 26,RT

## 2019-08-23 PROCEDURE — 29515 APPLICATION SHORT LEG SPLINT: CPT

## 2019-08-23 PROCEDURE — 99283 EMERGENCY DEPT VISIT LOW MDM: CPT | Mod: 25

## 2019-08-23 PROCEDURE — 73590 X-RAY EXAM OF LOWER LEG: CPT | Mod: 26,RT

## 2019-08-23 PROCEDURE — 73610 X-RAY EXAM OF ANKLE: CPT | Mod: 26,RT

## 2019-08-23 RX ORDER — IBUPROFEN 200 MG
600 TABLET ORAL ONCE
Refills: 0 | Status: COMPLETED | OUTPATIENT
Start: 2019-08-23 | End: 2019-08-23

## 2019-08-23 RX ADMIN — Medication 600 MILLIGRAM(S): at 11:56

## 2019-08-23 NOTE — ED PROVIDER NOTE - NSFOLLOWUPINSTRUCTIONS_ED_ALL_ED_FT
Ankle Sprain  Image   An ankle sprain is a stretch or tear in one of the tough tissues (ligaments) that connect the bones in your ankle. An ankle sprain can happen when the ankle rolls outward (inversion sprain) or inward (eversion sprain).    Follow these instructions at home:  Image   Rest your ankle.  Take over-the-counter and prescription medicines only as told by your doctor.  For 2–3 days, keep your ankle higher than the level of your heart (elevated) as much as possible.  If directed, put ice on the area:  Put ice in a plastic bag.  Place a towel between your skin and the bag.  Leave the ice on for 20 minutes, 2–3 times a day.  If you were given a brace:  Wear it as told.  Take it off to shower or bathe.  Try not to move your ankle much, but wiggle your toes from time to time. This helps to prevent swelling.  If you were given an elastic bandage (dressing):  Take it off when you shower or bathe.  Try not to move your ankle much, but wiggle your toes from time to time. This helps to prevent swelling.  Adjust the bandage to make it more comfortable if it feels too tight.  Loosen the bandage if you lose feeling in your foot, your foot tingles, or your foot gets cold and blue.  If you have crutches, use them as told by your doctor.  Contact a doctor if:  Your bruises or swelling are quickly getting worse.  Your pain does not get better after you take medicine.  Get help right away if:  You cannot feel your toes or foot.  Your foot or toes look blue.  You have very bad pain that gets worse.  Summary  An ankle sprain is a stretch or tear in one of the tough tissues (ligaments) that connect the bones in your ankle.  To relieve pain and swelling, place ice on the affected ankle, raise your ankle above the level of your heart, and use an elastic bandage. Also, rest as told by your health care provider.  This information is not intended to replace advice given to you by your health care provider. Make sure you discuss any questions you have with your health care provider.            RICE Therapy for Routine Care of Injuries    Many injuries can be cared for with rest, ice, compression, and elevation (RICE therapy). This includes:  Resting the injured part.  Putting ice on the injury.  Putting pressure (compression) on the injury.  Raising the injured part (elevation).  Using RICE therapy can help to lessen pain and swelling.    Supplies needed:  Ice.  Plastic bag.  Towel.  Elastic bandage.  Pillow or pillows to raise (elevate) your injured body part.  How to care for your injury with RICE therapy  Rest     Limit your normal activities, and try not to use the injured part of your body. You can go back to your normal activities when your doctor says it is okay to do them and you feel okay. Ask your doctor if you should do exercises to help your injury get better.    Ice     ImagePut ice on the injured area. Do not put ice on your bare skin.  Put ice in a plastic bag.  Place a towel between your skin and the bag.  Leave the ice on for 20 minutes, 2–3 times a day. Use ice on as many days as told by your doctor.  Compression     ImageCompression means putting pressure on the injured area. This can be done with an elastic bandage. If an elastic bandage has been put on your injury:  Do not wrap the bandage too tight. Wrap the bandage more loosely if part of your body away from the bandage is blue, swollen, cold, painful, or loses feeling (gets numb).  Take off the bandage and put it on again. Do this every 3–4 hours or as told by your doctor.  See your doctor if the bandage seems to make your problems worse.  Elevation     Elevation means keeping the injured area raised. If you can, raise the injured area above your heart or the center of your chest.    Contact a doctor if:  You keep having pain and swelling.  Your symptoms get worse.  Get help right away if:  You have sudden bad pain at your injury or lower than your injury.  You have redness or more swelling around your injury.  You have tingling or numbness at your injury or lower than your injury, and it does not go away when you take off the bandage.  Summary  Many injuries can be cared for using rest, ice, compression, and elevation (RICE therapy).  You can go back to your normal activities when you feel okay and your doctor says it is okay.  Put ice on the injured area as told by your doctor.  Get help if your symptoms get worse or if you keep having pain and swelling.

## 2019-08-23 NOTE — ED PROVIDER NOTE - CARE PROVIDER_API CALL
Warren Valdez (MD)  Surgery  [address] [place] [address]  Phone: [phone]  Fax: [phone]  Follow Up Time:

## 2019-08-23 NOTE — ED PROVIDER NOTE - OBJECTIVE STATEMENT
25 yo f pw R ankle pain sudden onset occurred during jumping exercise few hr pta, pt felt crack and sudden onset of R ankle pain radiating toward the R knee, unable to bear weight at the scene. No numbness, no paresthesias.    I have reviewed available current nursing and previous documentation of past medical, surgical, family, and/or social history.

## 2019-08-23 NOTE — ED PROVIDER NOTE - NS ED ROS FT
Review of Systems    Constitutional: (-) weakness  Musculoskeletal: (+) joint pain  Integumentary: (-) bruising  Neurological: (-) numbness  Heme/Lymph: (-) easy bruising (-) easy bleeding

## 2019-08-23 NOTE — ED PROVIDER NOTE - PHYSICAL EXAMINATION
Physical Exam    Vital Signs: I have reviewed the initial vital signs.  Constitutional: well-nourished, appears stated age, no acute distress  Cardiovascular: DP pulse +2 and equal, warm to touch and well perfused extermity b/l  Musculoskeletal: +TTP over the lat R ankle, - ant drawer signs, +TTP over the R knee b/l and proximal tib fib, no ligamentous instability.  Integumentary: no bruising, no abrasions  Neurologic: extremities’ motor and sensory functions grossly intact

## 2019-08-23 NOTE — ED ADULT NURSE NOTE - NSIMPLEMENTINTERV_GEN_ALL_ED
Implemented All Universal Safety Interventions:  [place] to call system. Call bell, personal items and telephone within reach. Instruct patient to call for assistance. Room bathroom lighting operational. Non-slip footwear when patient is off stretcher. Physically safe environment: no spills, clutter or unnecessary equipment. Stretcher in lowest position, wheels locked, appropriate side rails in place.

## 2019-08-23 NOTE — ED PROVIDER NOTE - PROGRESS NOTE DETAILS
ATTENDING NOTE:   23 y/o F p/w inversion injury to R ankle at gym. Pt heard a crack. Vital signs noted. Moderate lateral swelling of ankle. Mild swelling over deltoid ligament with (+) TTP. Negative drawer sign. XR reviewed, no FX. Splint applied, pt is NWB. Discharge with ortho follow up.

## 2019-09-19 ENCOUNTER — OTHER (OUTPATIENT)
Age: 24
End: 2019-09-19

## 2019-10-08 ENCOUNTER — OTHER (OUTPATIENT)
Age: 24
End: 2019-10-08

## 2019-10-14 ENCOUNTER — OTHER (OUTPATIENT)
Age: 24
End: 2019-10-14

## 2019-10-21 ENCOUNTER — OTHER (OUTPATIENT)
Age: 24
End: 2019-10-21

## 2019-10-25 ENCOUNTER — OTHER (OUTPATIENT)
Age: 24
End: 2019-10-25

## 2019-12-25 PROBLEM — R10.2 PELVIC PAIN IN FEMALE: Status: ACTIVE | Noted: 2019-02-26

## 2019-12-27 ENCOUNTER — EMERGENCY (EMERGENCY)
Facility: HOSPITAL | Age: 24
LOS: 0 days | Discharge: HOME | End: 2019-12-27
Attending: EMERGENCY MEDICINE | Admitting: EMERGENCY MEDICINE
Payer: MEDICAID

## 2019-12-27 VITALS
SYSTOLIC BLOOD PRESSURE: 131 MMHG | RESPIRATION RATE: 19 BRPM | OXYGEN SATURATION: 98 % | WEIGHT: 225.09 LBS | TEMPERATURE: 98 F | HEART RATE: 70 BPM | HEIGHT: 64 IN | DIASTOLIC BLOOD PRESSURE: 83 MMHG

## 2019-12-27 DIAGNOSIS — R10.31 RIGHT LOWER QUADRANT PAIN: ICD-10-CM

## 2019-12-27 DIAGNOSIS — R10.13 EPIGASTRIC PAIN: ICD-10-CM

## 2019-12-27 DIAGNOSIS — Z79.899 OTHER LONG TERM (CURRENT) DRUG THERAPY: ICD-10-CM

## 2019-12-27 LAB
ALBUMIN SERPL ELPH-MCNC: 4.6 G/DL — SIGNIFICANT CHANGE UP (ref 3.5–5.2)
ALP SERPL-CCNC: 73 U/L — SIGNIFICANT CHANGE UP (ref 30–115)
ALT FLD-CCNC: 59 U/L — HIGH (ref 0–41)
ANION GAP SERPL CALC-SCNC: 12 MMOL/L — SIGNIFICANT CHANGE UP (ref 7–14)
APPEARANCE UR: CLEAR — SIGNIFICANT CHANGE UP
AST SERPL-CCNC: 71 U/L — HIGH (ref 0–41)
BASOPHILS # BLD AUTO: 0.04 K/UL — SIGNIFICANT CHANGE UP (ref 0–0.2)
BASOPHILS NFR BLD AUTO: 0.4 % — SIGNIFICANT CHANGE UP (ref 0–1)
BILIRUB SERPL-MCNC: 0.5 MG/DL — SIGNIFICANT CHANGE UP (ref 0.2–1.2)
BILIRUB UR-MCNC: NEGATIVE — SIGNIFICANT CHANGE UP
BUN SERPL-MCNC: 10 MG/DL — SIGNIFICANT CHANGE UP (ref 10–20)
CALCIUM SERPL-MCNC: 9.7 MG/DL — SIGNIFICANT CHANGE UP (ref 8.5–10.1)
CHLORIDE SERPL-SCNC: 103 MMOL/L — SIGNIFICANT CHANGE UP (ref 98–110)
CO2 SERPL-SCNC: 26 MMOL/L — SIGNIFICANT CHANGE UP (ref 17–32)
COLOR SPEC: YELLOW — SIGNIFICANT CHANGE UP
CREAT SERPL-MCNC: 0.7 MG/DL — SIGNIFICANT CHANGE UP (ref 0.7–1.5)
DIFF PNL FLD: NEGATIVE — SIGNIFICANT CHANGE UP
EOSINOPHIL # BLD AUTO: 0.3 K/UL — SIGNIFICANT CHANGE UP (ref 0–0.7)
EOSINOPHIL NFR BLD AUTO: 2.8 % — SIGNIFICANT CHANGE UP (ref 0–8)
GLUCOSE SERPL-MCNC: 132 MG/DL — HIGH (ref 70–99)
GLUCOSE UR QL: NEGATIVE — SIGNIFICANT CHANGE UP
HCG SERPL QL: NEGATIVE — SIGNIFICANT CHANGE UP
HCT VFR BLD CALC: 45.9 % — SIGNIFICANT CHANGE UP (ref 37–47)
HGB BLD-MCNC: 14.9 G/DL — SIGNIFICANT CHANGE UP (ref 12–16)
IMM GRANULOCYTES NFR BLD AUTO: 0.4 % — HIGH (ref 0.1–0.3)
KETONES UR-MCNC: NEGATIVE — SIGNIFICANT CHANGE UP
LACTATE SERPL-SCNC: 1.2 MMOL/L — SIGNIFICANT CHANGE UP (ref 0.7–2)
LEUKOCYTE ESTERASE UR-ACNC: NEGATIVE — SIGNIFICANT CHANGE UP
LIDOCAIN IGE QN: 37 U/L — SIGNIFICANT CHANGE UP (ref 7–60)
LYMPHOCYTES # BLD AUTO: 1.87 K/UL — SIGNIFICANT CHANGE UP (ref 1.2–3.4)
LYMPHOCYTES # BLD AUTO: 17.6 % — LOW (ref 20.5–51.1)
MCHC RBC-ENTMCNC: 27.5 PG — SIGNIFICANT CHANGE UP (ref 27–31)
MCHC RBC-ENTMCNC: 32.5 G/DL — SIGNIFICANT CHANGE UP (ref 32–37)
MCV RBC AUTO: 84.8 FL — SIGNIFICANT CHANGE UP (ref 81–99)
MONOCYTES # BLD AUTO: 0.48 K/UL — SIGNIFICANT CHANGE UP (ref 0.1–0.6)
MONOCYTES NFR BLD AUTO: 4.5 % — SIGNIFICANT CHANGE UP (ref 1.7–9.3)
NEUTROPHILS # BLD AUTO: 7.88 K/UL — HIGH (ref 1.4–6.5)
NEUTROPHILS NFR BLD AUTO: 74.3 % — SIGNIFICANT CHANGE UP (ref 42.2–75.2)
NITRITE UR-MCNC: NEGATIVE — SIGNIFICANT CHANGE UP
NRBC # BLD: 0 /100 WBCS — SIGNIFICANT CHANGE UP (ref 0–0)
PH UR: 7 — SIGNIFICANT CHANGE UP (ref 5–8)
PLATELET # BLD AUTO: 181 K/UL — SIGNIFICANT CHANGE UP (ref 130–400)
POTASSIUM SERPL-MCNC: 4.2 MMOL/L — SIGNIFICANT CHANGE UP (ref 3.5–5)
POTASSIUM SERPL-SCNC: 4.2 MMOL/L — SIGNIFICANT CHANGE UP (ref 3.5–5)
PROT SERPL-MCNC: 6.8 G/DL — SIGNIFICANT CHANGE UP (ref 6–8)
PROT UR-MCNC: SIGNIFICANT CHANGE UP
RBC # BLD: 5.41 M/UL — HIGH (ref 4.2–5.4)
RBC # FLD: 12.9 % — SIGNIFICANT CHANGE UP (ref 11.5–14.5)
SODIUM SERPL-SCNC: 141 MMOL/L — SIGNIFICANT CHANGE UP (ref 135–146)
SP GR SPEC: 1.03 — HIGH (ref 1.01–1.02)
UROBILINOGEN FLD QL: SIGNIFICANT CHANGE UP
WBC # BLD: 10.61 K/UL — SIGNIFICANT CHANGE UP (ref 4.8–10.8)
WBC # FLD AUTO: 10.61 K/UL — SIGNIFICANT CHANGE UP (ref 4.8–10.8)

## 2019-12-27 PROCEDURE — 99284 EMERGENCY DEPT VISIT MOD MDM: CPT

## 2019-12-27 PROCEDURE — 71046 X-RAY EXAM CHEST 2 VIEWS: CPT | Mod: 26

## 2019-12-27 RX ORDER — ONDANSETRON 8 MG/1
1 TABLET, FILM COATED ORAL
Qty: 6 | Refills: 0
Start: 2019-12-27 | End: 2019-12-28

## 2019-12-27 RX ORDER — FAMOTIDINE 10 MG/ML
20 INJECTION INTRAVENOUS ONCE
Refills: 0 | Status: COMPLETED | OUTPATIENT
Start: 2019-12-27 | End: 2019-12-27

## 2019-12-27 RX ADMIN — Medication 20 MILLIGRAM(S): at 05:27

## 2019-12-27 RX ADMIN — FAMOTIDINE 104 MILLIGRAM(S): 10 INJECTION INTRAVENOUS at 05:27

## 2019-12-27 NOTE — ED PROVIDER NOTE - ATTENDING CONTRIBUTION TO CARE
pt co ab pain, epigastric, no relation to food. non radiating. no assoc n, v, d, fever or chills. dull ache like.  pt in nad, comfortable, anict, neck sup, ctab, rrr, mild epig tender, no murphys, no mcburnys tender. no rov/obtur/psoas/cvat.  labs, reassess.

## 2019-12-27 NOTE — ED PROVIDER NOTE - OBJECTIVE STATEMENT
25 y/o female with pmhx of ovarian cysts presents with abdominal pain. Patient states the pain began around midnight, woke her up from sleep, located to epigastrium, non-radiating, feels like cramping/twisting sensation. Denies fevers/chills, sob, chest pain, vomiting, diarrhea, nausea, constipation, hx of abdominal surgeries, dysuria, leg swelling

## 2019-12-27 NOTE — ED PROVIDER NOTE - PROGRESS NOTE DETAILS
John: patient aware of results, aware she needs to follow up with PMD/GI regarding slightly elevated LFTs. Patient feels better, agrees with plan to go home. Strict return precautions given. feeling better. ab soft, nt. return instructions given including return for worsening pain, change in location césar rlq, fever.

## 2019-12-27 NOTE — ED PROVIDER NOTE - PATIENT PORTAL LINK FT
You can access the FollowMyHealth Patient Portal offered by [facility] by registering at the following website: [email or web address]. By joining [employer]’s FollowMyHealth portal, you will also be able to view your health information using other applications (apps) compatible with our system.

## 2019-12-27 NOTE — ED PROVIDER NOTE - PHYSICAL EXAMINATION
CONSTITUTIONAL: Well-developed; well-nourished; in no acute distress.   SKIN: warm, dry  HEAD: Normocephalic; atraumatic.  EYES: no conj injection  ENT: No nasal discharge; airway clear.  NECK: Supple; non tender.  CARD: S1, S2 normal; no murmurs, gallops, or rubs. Regular rate and rhythm.   RESP: No wheezes, rales or rhonchi.  ABD: +mild epigastric tenderness, soft, non-distended   EXT: Normal ROM.  No clubbing, cyanosis or edema.   NEURO: Alert, oriented, grossly unremarkable  PSYCH: Cooperative, appropriate.

## 2019-12-27 NOTE — ED PROVIDER NOTE - NS ED ROS FT
Constitutional: See HPI.  Eyes: No visual changes, eye pain or discharge.  ENMT: No hearing changes, pain, discharge or infections. No neck pain or stiffness.  Cardiac: No chest pain, SOB or edema. No chest pain with exertion.  Respiratory: No cough or respiratory distress.   GI: +abdominal pain; No nausea, vomiting, diarrhea   : No dysuria, frequency or burning.  MS: No myalgia, muscle weakness, joint pain or back pain.  Neuro: No headache or weakness. No LOC.  Skin: No skin rash.  Endo: No hx of DM, thyroid disease  Except as documented in HPI, all other review of systems is negative

## 2019-12-27 NOTE — ED PROVIDER NOTE - CLINICAL SUMMARY MEDICAL DECISION MAKING FREE TEXT BOX
pt with epig pain, resolved after pepcid and bentyl. no fever, n, v, d. no gyn complaints. no tenderness of RLQ. bedside US reveals nml gallbladder.  dc home. outpt fu.

## 2019-12-27 NOTE — ED PROVIDER NOTE - PROVIDER TOKENS
PROVIDER:[TOKEN:[[id number]],FOLLOWUP:[1-3 Days]],PROVIDER:[TOKEN:[[id number]],FOLLOWUP:[1-3 Days]]

## 2019-12-27 NOTE — ED PROVIDER NOTE - CARE PROVIDER_API CALL
Luis M Juárez)  Gastroenterology  [address] [place] [address]  Phone: [phone]  Fax: [phone]  Follow Up Time: 1-3 Days    Rickey Khan)  Internal Medicine  [address] [place] [address]  Phone: [phone]  Fax: [phone]  Follow Up Time: 1-3 Days

## 2020-07-21 PROBLEM — N83.209 UNSPECIFIED OVARIAN CYST, UNSPECIFIED SIDE: Chronic | Status: ACTIVE | Noted: 2019-12-27

## 2020-09-02 ENCOUNTER — APPOINTMENT (OUTPATIENT)
Dept: OBGYN | Facility: CLINIC | Age: 25
End: 2020-09-02
Payer: MEDICAID

## 2020-09-02 VITALS — SYSTOLIC BLOOD PRESSURE: 110 MMHG | DIASTOLIC BLOOD PRESSURE: 70 MMHG | WEIGHT: 234 LBS | TEMPERATURE: 98.2 F

## 2020-09-02 DIAGNOSIS — Z01.419 ENCOUNTER FOR GYNECOLOGICAL EXAMINATION (GENERAL) (ROUTINE) W/OUT ABNORMAL FINDINGS: ICD-10-CM

## 2020-09-02 DIAGNOSIS — N83.292 OTHER OVARIAN CYST, LEFT SIDE: ICD-10-CM

## 2020-09-02 DIAGNOSIS — G56.03 CARPAL TUNNEL SYNDROM,BILATERAL UPPER LIMBS: ICD-10-CM

## 2020-09-02 LAB
BILIRUB UR QL STRIP: NORMAL
GLUCOSE UR-MCNC: NORMAL
HCG UR QL: 0.2 EU/DL
HGB UR QL STRIP.AUTO: NORMAL
KETONES UR-MCNC: NORMAL
LEUKOCYTE ESTERASE UR QL STRIP: NORMAL
NITRITE UR QL STRIP: NORMAL
PH UR STRIP: 6
PROT UR STRIP-MCNC: NORMAL
SP GR UR STRIP: 1.02

## 2020-09-02 PROCEDURE — 99395 PREV VISIT EST AGE 18-39: CPT

## 2020-09-02 PROCEDURE — 81003 URINALYSIS AUTO W/O SCOPE: CPT | Mod: QW

## 2020-09-02 RX ORDER — VALACYCLOVIR 500 MG/1
500 TABLET, FILM COATED ORAL DAILY
Qty: 30 | Refills: 2 | Status: COMPLETED | COMMUNITY
Start: 2017-07-27 | End: 2020-09-02

## 2020-09-02 RX ORDER — CLOTRIMAZOLE AND BETAMETHASONE DIPROPIONATE 10; .5 MG/G; MG/G
1-0.05 CREAM TOPICAL TWICE DAILY
Qty: 1 | Refills: 1 | Status: COMPLETED | COMMUNITY
Start: 2019-02-26 | End: 2020-09-02

## 2020-09-02 RX ORDER — VALACYCLOVIR 1 G/1
1 TABLET, FILM COATED ORAL DAILY
Qty: 90 | Refills: 1 | Status: ACTIVE | COMMUNITY
Start: 2019-02-26 | End: 1900-01-01

## 2020-09-02 NOTE — PHYSICAL EXAM
[Awake] : awake [Acute Distress] : no acute distress [Alert] : alert [LAD] : no lymphadenopathy [Thyroid Nodule] : no thyroid nodule [Goiter] : no goiter [Mass] : no breast mass [Nipple Discharge] : no nipple discharge [Soft] : soft [Axillary LAD] : no axillary lymphadenopathy [Tender] : non tender [Distended] : not distended [Depressed Mood] : not depressed [Oriented x3] : oriented to person, place, and time [Normal] : uterus [Pap Obtained] : a Pap smear was performed [IUD String] : had an IUD string protruding out [Uterine Adnexae] : were not tender and not enlarged [RRR, No Murmurs] : RRR, no murmurs [CTAB] : CTAB

## 2020-09-02 NOTE — COUNSELING
[Breast Self Exam] : breast self exam [Exercise] : exercise [Vitamins/Supplements] : vitamins/supplements [Contraception] : contraception [Safe Sexual Practices] : safe sexual practices

## 2020-09-05 ENCOUNTER — EMERGENCY (EMERGENCY)
Facility: HOSPITAL | Age: 25
LOS: 0 days | Discharge: HOME | End: 2020-09-05
Attending: EMERGENCY MEDICINE | Admitting: EMERGENCY MEDICINE
Payer: MEDICAID

## 2020-09-05 VITALS
RESPIRATION RATE: 18 BRPM | SYSTOLIC BLOOD PRESSURE: 121 MMHG | DIASTOLIC BLOOD PRESSURE: 76 MMHG | TEMPERATURE: 99 F | HEART RATE: 98 BPM | OXYGEN SATURATION: 99 %

## 2020-09-05 VITALS
DIASTOLIC BLOOD PRESSURE: 747 MMHG | SYSTOLIC BLOOD PRESSURE: 125 MMHG | RESPIRATION RATE: 20 BRPM | OXYGEN SATURATION: 96 % | HEART RATE: 122 BPM | TEMPERATURE: 102 F

## 2020-09-05 DIAGNOSIS — M54.5 LOW BACK PAIN: ICD-10-CM

## 2020-09-05 DIAGNOSIS — Z20.828 CONTACT WITH AND (SUSPECTED) EXPOSURE TO OTHER VIRAL COMMUNICABLE DISEASES: ICD-10-CM

## 2020-09-05 DIAGNOSIS — R10.9 UNSPECIFIED ABDOMINAL PAIN: ICD-10-CM

## 2020-09-05 DIAGNOSIS — R50.9 FEVER, UNSPECIFIED: ICD-10-CM

## 2020-09-05 LAB
ALBUMIN SERPL ELPH-MCNC: 4.6 G/DL — SIGNIFICANT CHANGE UP (ref 3.5–5.2)
ALP SERPL-CCNC: 61 U/L — SIGNIFICANT CHANGE UP (ref 30–115)
ALT FLD-CCNC: 51 U/L — HIGH (ref 0–41)
ANION GAP SERPL CALC-SCNC: 10 MMOL/L — SIGNIFICANT CHANGE UP (ref 7–14)
APPEARANCE UR: CLEAR — SIGNIFICANT CHANGE UP
AST SERPL-CCNC: 27 U/L — SIGNIFICANT CHANGE UP (ref 0–41)
BACTERIA # UR AUTO: NEGATIVE — SIGNIFICANT CHANGE UP
BASOPHILS # BLD AUTO: 0.02 K/UL — SIGNIFICANT CHANGE UP (ref 0–0.2)
BASOPHILS NFR BLD AUTO: 0.2 % — SIGNIFICANT CHANGE UP (ref 0–1)
BILIRUB SERPL-MCNC: 0.7 MG/DL — SIGNIFICANT CHANGE UP (ref 0.2–1.2)
BILIRUB UR-MCNC: NEGATIVE — SIGNIFICANT CHANGE UP
BUN SERPL-MCNC: 10 MG/DL — SIGNIFICANT CHANGE UP (ref 10–20)
CALCIUM SERPL-MCNC: 9.3 MG/DL — SIGNIFICANT CHANGE UP (ref 8.5–10.1)
CHLORIDE SERPL-SCNC: 105 MMOL/L — SIGNIFICANT CHANGE UP (ref 98–110)
CO2 SERPL-SCNC: 21 MMOL/L — SIGNIFICANT CHANGE UP (ref 17–32)
COLOR SPEC: YELLOW — SIGNIFICANT CHANGE UP
CREAT SERPL-MCNC: 0.8 MG/DL — SIGNIFICANT CHANGE UP (ref 0.7–1.5)
DIFF PNL FLD: NEGATIVE — SIGNIFICANT CHANGE UP
EOSINOPHIL # BLD AUTO: 0.02 K/UL — SIGNIFICANT CHANGE UP (ref 0–0.7)
EOSINOPHIL NFR BLD AUTO: 0.2 % — SIGNIFICANT CHANGE UP (ref 0–8)
EPI CELLS # UR: 7 /HPF — HIGH (ref 0–5)
GLUCOSE SERPL-MCNC: 134 MG/DL — HIGH (ref 70–99)
GLUCOSE UR QL: NEGATIVE — SIGNIFICANT CHANGE UP
HCT VFR BLD CALC: 43.9 % — SIGNIFICANT CHANGE UP (ref 37–47)
HGB BLD-MCNC: 14.3 G/DL — SIGNIFICANT CHANGE UP (ref 12–16)
HYALINE CASTS # UR AUTO: 3 /LPF — SIGNIFICANT CHANGE UP (ref 0–7)
IMM GRANULOCYTES NFR BLD AUTO: 0.5 % — HIGH (ref 0.1–0.3)
KETONES UR-MCNC: NEGATIVE — SIGNIFICANT CHANGE UP
LACTATE SERPL-SCNC: 1.4 MMOL/L — SIGNIFICANT CHANGE UP (ref 0.7–2)
LEUKOCYTE ESTERASE UR-ACNC: ABNORMAL
LYMPHOCYTES # BLD AUTO: 0.7 K/UL — LOW (ref 1.2–3.4)
LYMPHOCYTES # BLD AUTO: 5.8 % — LOW (ref 20.5–51.1)
MCHC RBC-ENTMCNC: 27.7 PG — SIGNIFICANT CHANGE UP (ref 27–31)
MCHC RBC-ENTMCNC: 32.6 G/DL — SIGNIFICANT CHANGE UP (ref 32–37)
MCV RBC AUTO: 84.9 FL — SIGNIFICANT CHANGE UP (ref 81–99)
MONOCYTES # BLD AUTO: 0.58 K/UL — SIGNIFICANT CHANGE UP (ref 0.1–0.6)
MONOCYTES NFR BLD AUTO: 4.8 % — SIGNIFICANT CHANGE UP (ref 1.7–9.3)
NEUTROPHILS # BLD AUTO: 10.74 K/UL — HIGH (ref 1.4–6.5)
NEUTROPHILS NFR BLD AUTO: 88.5 % — HIGH (ref 42.2–75.2)
NITRITE UR-MCNC: NEGATIVE — SIGNIFICANT CHANGE UP
NRBC # BLD: 0 /100 WBCS — SIGNIFICANT CHANGE UP (ref 0–0)
PH UR: 8.5 — HIGH (ref 5–8)
PLATELET # BLD AUTO: 155 K/UL — SIGNIFICANT CHANGE UP (ref 130–400)
POTASSIUM SERPL-MCNC: 3.9 MMOL/L — SIGNIFICANT CHANGE UP (ref 3.5–5)
POTASSIUM SERPL-SCNC: 3.9 MMOL/L — SIGNIFICANT CHANGE UP (ref 3.5–5)
PROT SERPL-MCNC: 7 G/DL — SIGNIFICANT CHANGE UP (ref 6–8)
PROT UR-MCNC: ABNORMAL
RBC # BLD: 5.17 M/UL — SIGNIFICANT CHANGE UP (ref 4.2–5.4)
RBC # FLD: 12.8 % — SIGNIFICANT CHANGE UP (ref 11.5–14.5)
RBC CASTS # UR COMP ASSIST: 1 /HPF — SIGNIFICANT CHANGE UP (ref 0–4)
SODIUM SERPL-SCNC: 136 MMOL/L — SIGNIFICANT CHANGE UP (ref 135–146)
SP GR SPEC: 1.03 — HIGH (ref 1.01–1.02)
UROBILINOGEN FLD QL: ABNORMAL
WBC # BLD: 12.12 K/UL — HIGH (ref 4.8–10.8)
WBC # FLD AUTO: 12.12 K/UL — HIGH (ref 4.8–10.8)
WBC UR QL: 7 /HPF — HIGH (ref 0–5)

## 2020-09-05 PROCEDURE — 71045 X-RAY EXAM CHEST 1 VIEW: CPT | Mod: 26

## 2020-09-05 PROCEDURE — 99285 EMERGENCY DEPT VISIT HI MDM: CPT

## 2020-09-05 PROCEDURE — 93010 ELECTROCARDIOGRAM REPORT: CPT

## 2020-09-05 RX ORDER — SODIUM CHLORIDE 9 MG/ML
1000 INJECTION, SOLUTION INTRAVENOUS ONCE
Refills: 0 | Status: COMPLETED | OUTPATIENT
Start: 2020-09-05 | End: 2020-09-05

## 2020-09-05 RX ORDER — SODIUM CHLORIDE 9 MG/ML
2000 INJECTION, SOLUTION INTRAVENOUS ONCE
Refills: 0 | Status: COMPLETED | OUTPATIENT
Start: 2020-09-05 | End: 2020-09-05

## 2020-09-05 RX ORDER — ACETAMINOPHEN 500 MG
975 TABLET ORAL ONCE
Refills: 0 | Status: COMPLETED | OUTPATIENT
Start: 2020-09-05 | End: 2020-09-05

## 2020-09-05 RX ORDER — ACETAMINOPHEN 500 MG
650 TABLET ORAL ONCE
Refills: 0 | Status: COMPLETED | OUTPATIENT
Start: 2020-09-05 | End: 2020-09-05

## 2020-09-05 RX ADMIN — SODIUM CHLORIDE 1000 MILLILITER(S): 9 INJECTION, SOLUTION INTRAVENOUS at 10:30

## 2020-09-05 RX ADMIN — SODIUM CHLORIDE 2000 MILLILITER(S): 9 INJECTION, SOLUTION INTRAVENOUS at 07:59

## 2020-09-05 RX ADMIN — Medication 650 MILLIGRAM(S): at 06:39

## 2020-09-05 RX ADMIN — Medication 325 MILLIGRAM(S): at 08:01

## 2020-09-05 NOTE — ED PROVIDER NOTE - PATIENT PORTAL LINK FT
You can access the FollowMyHealth Patient Portal offered by [facility] by registering at the following website: [email or web address]. By joining [employer]’s FollowMyHealth portal, you will also be able to view your health information using other applications (apps) compatible with our system.

## 2020-09-05 NOTE — ED PROVIDER NOTE - ATTENDING CONTRIBUTION TO CARE
24 y/o non smoker presents to ED with myalgia, fever and malaise x 1 day.  No CP/SOB.  No abdominal pain.  No N/V/D.  No dysuria, frequency or hematuria.  No vaginal bleeding or discharge.  PE:  agree with above.  A/P:  Malaise. Labs, Meds, IVF. Reassess.

## 2020-09-05 NOTE — ED PROVIDER NOTE - NS ED ROS FT
Constitutional: (+) fever, (+) chills  Eyes: (-) visual changes  ENT: (-) nasal congestions  Cardiovascular: (-) chest pain, (-) syncope  Respiratory: (-) cough, (-) shortness of breath, (-) dyspnea,   Gastrointestinal: (-) vomiting, (-) diarrhea, (-)nausea,  Musculoskeletal: (-) neck pain, (+) back pain, (-) joint pain,  Integumentary: (-) rash, (-) edema, (-) bruises  Neurological: (-) headache, (-) loc, (-) dizziness, (-) tingling, (-)numbness,  Psychiatric: (-) hallucinations, (-)nervousness, (-)depression, (-)SI/HI  Peripheral Vascular: (-) leg swelling  :  (-)dysuria,  (-) hematuria  Allergic/Immunologic: (-) pruritus

## 2020-09-05 NOTE — ED PROVIDER NOTE - PHYSICAL EXAMINATION
Vital Signs: I have reviewed the initial vital signs.  Constitutional: well-nourished, appears stated age, no acute distress  Eyes: Conjunctiva pink, Sclera clear, PERRLA, EOMI.  ENT: OP is clear without exudates, normal dentition, normal gingival, tongue without swelling, TMs clear b/l, nasal turbinates without erythema or discharge, no lymphadenopathy.  Cardiovascular: S1 and S2, tachycardic, otherwise RR, well-perfused extremities, radial pulses equal and 2+  Respiratory: unlabored respiratory effort, clear to auscultation bilaterally no wheezing, rales and rhonchi  Gastrointestinal: soft, non-tender abdomen, no pulsatile mass, normal bowl sounds  Musculoskeletal: supple neck, no lower extremity edema, no midline tenderness  Integumentary: warm, dry, no rash  Neurologic: awake, alert, cranial nerves II-XII grossly intact, extremities’ motor and sensory functions grossly intact  Psychiatric: appropriate mood, appropriate affect

## 2020-09-05 NOTE — ED ADULT NURSE NOTE - NSIMPLEMENTINTERV_GEN_ALL_ED
Implemented All Universal Safety Interventions:  [place] to call system. Call bell, personal items and telephone within reach. Instruct patient to call for assistance. Room bathroom lighting operational. Non-slip footwear when patient is off stretcher. Physically safe environment: no spills, clutter or unnecessary equipment. Stretcher in lowest position, wheels locked, appropriate side rails in place.

## 2020-09-05 NOTE — ED PROVIDER NOTE - CLINICAL SUMMARY MEDICAL DECISION MAKING FREE TEXT BOX
No signs of sepsis.  Labs reassuring.  CXR normal.  D/C home.  Continue supportive care with fluids, antipyretics and rest.  Strict return instructions discussed.

## 2020-09-05 NOTE — ED ADULT TRIAGE NOTE - CHIEF COMPLAINT QUOTE
I have lower back pain and abdomen pain really bad, last time I came to the ER with this pains, they told me I had ruptured cysts - patient

## 2020-09-05 NOTE — ED PROVIDER NOTE - OBJECTIVE STATEMENT
26 y/o F with no PMH p/w f/c and lower back pain starting this AM described as mild, aching, non-radiating. Denies taking OTC medication, CP, SOB, LE edema, dysuria/hematuria, n/v/d, and abdominal pain.

## 2020-09-05 NOTE — ED ADULT NURSE NOTE - OBJECTIVE STATEMENT
Pt c/o abd pain, back pain, and fever since yesterday. Pt states last time she had similar pain she had ruptured cyst. Denies N/V/D.

## 2020-09-05 NOTE — ED ADULT TRIAGE NOTE - NSTRIAGECARE_GEN_A_ER
Face Mask/urine cup Face Mask/Medication (see EMAR) i.e. Neb, Tylenol, Motrin/urine cup, Tylenol 650 mg PO

## 2020-09-06 LAB — SARS-COV-2 RNA SPEC QL NAA+PROBE: SIGNIFICANT CHANGE UP

## 2020-09-07 LAB
CULTURE RESULTS: SIGNIFICANT CHANGE UP
SPECIMEN SOURCE: SIGNIFICANT CHANGE UP

## 2020-09-10 LAB
CULTURE RESULTS: SIGNIFICANT CHANGE UP
CULTURE RESULTS: SIGNIFICANT CHANGE UP
SPECIMEN SOURCE: SIGNIFICANT CHANGE UP
SPECIMEN SOURCE: SIGNIFICANT CHANGE UP

## 2024-01-16 NOTE — ED ADULT NURSE NOTE - NS_SISCREENINGSR_GEN_ALL_ED
[facility] Medicine Outpatient        SUBJECTIVE:  CC: had concerns including Headache (Pt c/o HA, sweats and chills, sinus congestion, productive cough with light green mucous for a little > one week. Pt's Baby currently has Bronchitis.  ).  HPI:  Cheyenne Hameed is a female 21 y.o. presented to the clinic complaining of chills, productive head congestion with green mucus for more than a week. Her baby reportedly is sick with what sounds like bronchiolitis. No other sick contact.    Review of Systems   Constitutional:  Positive for chills. Negative for appetite change, fatigue and fever.   HENT:  Positive for congestion.    Respiratory:  Negative for cough, shortness of breath and wheezing.    Cardiovascular:  Negative for chest pain and palpitations.   Gastrointestinal:  Negative for abdominal pain, constipation, diarrhea, nausea and vomiting.   Psychiatric/Behavioral:  Negative for suicidal ideas. The patient is nervous/anxious.        Outpatient Medications Marked as Taking for the 1/16/24 encounter (Office Visit) with Anai Bran MD   Medication Sig Dispense Refill    medroxyPROGESTERone (PROVERA) 10 MG tablet Take 1 tablet by mouth daily      hydrOXYzine HCl (ATARAX) 10 MG tablet Take 1 tablet by mouth 2 times daily as needed for Anxiety 60 tablet 0    sertraline (ZOLOFT) 50 MG tablet Take 1 tablet by mouth daily 30 tablet 1    cefdinir (OMNICEF) 300 MG capsule Take 1 capsule by mouth 2 times daily for 10 days 20 capsule 0    methylPREDNISolone (MEDROL DOSEPACK) 4 MG tablet Take by mouth. 1 kit 0       I have reviewed all pertinent PMHx, PSHx, FamHx, SocialHx, medications, and allergies and updated history as appropriate.    OBJECTIVE    VS: /88 (Site: Left Upper Arm, Position: Sitting)   Pulse (!) 104   Temp 96.9 °F (36.1 °C) (Temporal)   Resp 18   Ht 1.676 m (5' 5.98\")   Wt 125.6 kg (277 lb)   SpO2 97%   BMI 44.73 kg/m²   Physical Exam  Constitutional:       General: She is 
Negative